# Patient Record
Sex: MALE | Race: WHITE
[De-identification: names, ages, dates, MRNs, and addresses within clinical notes are randomized per-mention and may not be internally consistent; named-entity substitution may affect disease eponyms.]

---

## 2018-03-30 ENCOUNTER — HOSPITAL ENCOUNTER (INPATIENT)
Dept: HOSPITAL 17 - PHED | Age: 83
LOS: 1 days | Discharge: HOME HEALTH | DRG: 69 | End: 2018-03-31
Attending: HOSPITALIST | Admitting: HOSPITALIST
Payer: MEDICARE

## 2018-03-30 VITALS
HEART RATE: 71 BPM | RESPIRATION RATE: 20 BRPM | OXYGEN SATURATION: 95 % | TEMPERATURE: 97.1 F | DIASTOLIC BLOOD PRESSURE: 91 MMHG | SYSTOLIC BLOOD PRESSURE: 161 MMHG

## 2018-03-30 VITALS
DIASTOLIC BLOOD PRESSURE: 84 MMHG | SYSTOLIC BLOOD PRESSURE: 153 MMHG | OXYGEN SATURATION: 97 % | RESPIRATION RATE: 18 BRPM | HEART RATE: 70 BPM

## 2018-03-30 VITALS
SYSTOLIC BLOOD PRESSURE: 192 MMHG | TEMPERATURE: 97.3 F | HEART RATE: 82 BPM | DIASTOLIC BLOOD PRESSURE: 98 MMHG | OXYGEN SATURATION: 96 % | RESPIRATION RATE: 18 BRPM

## 2018-03-30 VITALS — WEIGHT: 154.54 LBS | BODY MASS INDEX: 24.84 KG/M2 | HEIGHT: 66 IN

## 2018-03-30 VITALS — HEART RATE: 69 BPM

## 2018-03-30 DIAGNOSIS — G45.9: Primary | ICD-10-CM

## 2018-03-30 DIAGNOSIS — G62.9: ICD-10-CM

## 2018-03-30 DIAGNOSIS — Z72.0: ICD-10-CM

## 2018-03-30 DIAGNOSIS — E78.5: ICD-10-CM

## 2018-03-30 DIAGNOSIS — Z88.6: ICD-10-CM

## 2018-03-30 DIAGNOSIS — Z88.5: ICD-10-CM

## 2018-03-30 DIAGNOSIS — Z85.038: ICD-10-CM

## 2018-03-30 DIAGNOSIS — I44.4: ICD-10-CM

## 2018-03-30 LAB
ALBUMIN SERPL-MCNC: 3.3 GM/DL (ref 3.4–5)
ALP SERPL-CCNC: 79 U/L (ref 45–117)
ALT SERPL-CCNC: 24 U/L (ref 12–78)
AST SERPL-CCNC: 29 U/L (ref 15–37)
BACTERIA #/AREA URNS HPF: (no result) /HPF
BASOPHILS # BLD AUTO: 0.1 TH/MM3 (ref 0–0.2)
BASOPHILS NFR BLD: 0.6 % (ref 0–2)
BILIRUB SERPL-MCNC: 0.3 MG/DL (ref 0.2–1)
BUN SERPL-MCNC: 20 MG/DL (ref 7–18)
CALCIUM SERPL-MCNC: 8.4 MG/DL (ref 8.5–10.1)
CHLORIDE SERPL-SCNC: 98 MEQ/L (ref 98–107)
COLOR UR: YELLOW
CREAT SERPL-MCNC: 0.95 MG/DL (ref 0.6–1.3)
EOSINOPHIL # BLD: 0.3 TH/MM3 (ref 0–0.4)
EOSINOPHIL NFR BLD: 2.8 % (ref 0–4)
ERYTHROCYTE [DISTWIDTH] IN BLOOD BY AUTOMATED COUNT: 13 % (ref 11.6–17.2)
GFR SERPLBLD BASED ON 1.73 SQ M-ARVRAT: 76 ML/MIN (ref 89–?)
GLUCOSE SERPL-MCNC: 98 MG/DL (ref 74–106)
GLUCOSE UR STRIP-MCNC: (no result) MG/DL
HCO3 BLD-SCNC: 28.3 MEQ/L (ref 21–32)
HCT VFR BLD CALC: 47 % (ref 39–51)
HGB BLD-MCNC: 15.4 GM/DL (ref 13–17)
HGB UR QL STRIP: (no result)
INR PPP: 1 RATIO
KETONES UR STRIP-MCNC: 15 MG/DL
LEUKOCYTE ESTERASE UR QL STRIP: (no result) /HPF (ref 0–5)
LYMPHOCYTES # BLD AUTO: 2.3 TH/MM3 (ref 1–4.8)
LYMPHOCYTES NFR BLD AUTO: 20.4 % (ref 9–44)
MCH RBC QN AUTO: 29.2 PG (ref 27–34)
MCHC RBC AUTO-ENTMCNC: 32.8 % (ref 32–36)
MCV RBC AUTO: 89 FL (ref 80–100)
MONOCYTE #: 1.3 TH/MM3 (ref 0–0.9)
MONOCYTES NFR BLD: 11.2 % (ref 0–8)
NEUTROPHILS # BLD AUTO: 7.4 TH/MM3 (ref 1.8–7.7)
NEUTROPHILS NFR BLD AUTO: 65 % (ref 16–70)
NITRITE UR QL STRIP: (no result)
PLATELET # BLD: 267 TH/MM3 (ref 150–450)
PMV BLD AUTO: 8.9 FL (ref 7–11)
PROT SERPL-MCNC: 7.2 GM/DL (ref 6.4–8.2)
PROTHROMBIN TIME: 10.4 SEC (ref 9.8–11.6)
RBC # BLD AUTO: 5.28 MIL/MM3 (ref 4.5–5.9)
RBC #/AREA URNS HPF: (no result) /HPF (ref 0–3)
SODIUM SERPL-SCNC: 131 MEQ/L (ref 136–145)
SP GR UR STRIP: (no result) (ref 1–1.03)
SQUAMOUS #/AREA URNS HPF: (no result) /HPF (ref 0–5)
TROPONIN I SERPL-MCNC: (no result) NG/ML (ref 0.02–0.05)
URINE LEUKOCYTE ESTERASE: (no result)
WBC # BLD AUTO: 11.4 TH/MM3 (ref 4–11)

## 2018-03-30 PROCEDURE — 85025 COMPLETE CBC W/AUTO DIFF WBC: CPT

## 2018-03-30 PROCEDURE — 70551 MRI BRAIN STEM W/O DYE: CPT

## 2018-03-30 PROCEDURE — 93005 ELECTROCARDIOGRAM TRACING: CPT

## 2018-03-30 PROCEDURE — 83036 HEMOGLOBIN GLYCOSYLATED A1C: CPT

## 2018-03-30 PROCEDURE — 93880 EXTRACRANIAL BILAT STUDY: CPT

## 2018-03-30 PROCEDURE — 82550 ASSAY OF CK (CPK): CPT

## 2018-03-30 PROCEDURE — 93225 XTRNL ECG REC<48 HRS REC: CPT

## 2018-03-30 PROCEDURE — 70450 CT HEAD/BRAIN W/O DYE: CPT

## 2018-03-30 PROCEDURE — 93306 TTE W/DOPPLER COMPLETE: CPT

## 2018-03-30 PROCEDURE — 93226 XTRNL ECG REC<48 HR SCAN A/R: CPT

## 2018-03-30 PROCEDURE — 84484 ASSAY OF TROPONIN QUANT: CPT

## 2018-03-30 PROCEDURE — 81001 URINALYSIS AUTO W/SCOPE: CPT

## 2018-03-30 PROCEDURE — 82948 REAGENT STRIP/BLOOD GLUCOSE: CPT

## 2018-03-30 PROCEDURE — 99285 EMERGENCY DEPT VISIT HI MDM: CPT

## 2018-03-30 PROCEDURE — 71045 X-RAY EXAM CHEST 1 VIEW: CPT

## 2018-03-30 PROCEDURE — 80053 COMPREHEN METABOLIC PANEL: CPT

## 2018-03-30 PROCEDURE — 85730 THROMBOPLASTIN TIME PARTIAL: CPT

## 2018-03-30 PROCEDURE — 80061 LIPID PANEL: CPT

## 2018-03-30 PROCEDURE — 85610 PROTHROMBIN TIME: CPT

## 2018-03-30 PROCEDURE — 82607 VITAMIN B-12: CPT

## 2018-03-30 RX ADMIN — Medication SCH ML: at 21:49

## 2018-03-30 RX ADMIN — PHENYTOIN SODIUM SCH MLS/HR: 50 INJECTION INTRAMUSCULAR; INTRAVENOUS at 21:48

## 2018-03-30 RX ADMIN — STANDARDIZED SENNA CONCENTRATE AND DOCUSATE SODIUM SCH TAB: 8.6; 5 TABLET, FILM COATED ORAL at 21:48

## 2018-03-30 NOTE — RADRPT
EXAM DATE/TIME:  03/30/2018 19:57 

 

[facility] COMPARISON:     

No previous studies available for comparison.

 

                     

INDICATIONS :     

Chest pain. 

                     

 

MEDICAL HISTORY :     

Hypercholesterolemia.  Gastroesophageal reflux disease.     Neuropathy, Gastroenteritis, Shingles   

 

SURGICAL HISTORY :        

Colectomy, Left rotator cuff surgery

 

ENCOUNTER:     

Initial                                        

 

ACUITY:     

1 day      

 

PAIN SCORE:     

1/10

 

LOCATION:     

Bilateral chest 

 

FINDINGS:     

A single view of the chest demonstrates the lungs to be symmetrically aerated without evidence of mas
s, infiltrate or effusion.  The cardiomediastinal contours are unremarkable.  Osseous structures are 
intact.

 

CONCLUSION:     No acute disease.  

 

 

 

 Dank Cunningham MD on March 30, 2018 at 20:27           

Board Certified Radiologist.

 This report was verified electronically.

## 2018-03-30 NOTE — PD
HPI


Chief Complaint:  Speech difficulty


Time Seen by Provider:  19:22


Travel History


International Travel<30 days:  No


Contact w/Intl Traveler<30days:  No





History of Present Illness


HPI


Is an 82-year-old man presents to the emergency department with speech 

difficulty.  Wife reports he is a history of peripheral neuropathy and has some 

gait difficulties.  They went out to dinner.  He was doing fine at 530 or so.  

He got at home about 6.  He was walking in the house so she parked the car.  

When she saw him next he was having speech difficulties.  She reports that he 

was unable to appropriately form words, or express thoughts.  He described 

awareness that he could not get his thoughts out appropriately.  She did not 

notice any numbness tingling or weakness or facial asymmetry.  He was still 

walking.  He denies any history of previous similar symptoms.  No history of 

heart disease, arrhythmia, or diabetes.





History


Past Medical History


*** Narrative Medical


Peripheral neuropathy


Remote history of colon CA in the past


Hyperlipidemia





Social History


Alcohol Use:  No


Tobacco Use:  Yes (1PPD)





Allergies-Medications


(Allergen,Severity, Reaction):  


Coded Allergies:  


     acetaminophen (Unverified  Allergy, Severe, 3/30/18)


     oxycodone (Unverified  Allergy, Severe, 3/30/18)


 PERCOCET


     morphine (Unverified  Adverse Reaction, Mild, ITCHY, 3/30/18)


Reported Meds & Prescriptions





Reported Meds & Active Scripts


Active


Robaxin (Methocarbamol) 500 Mg Tab 500 Mg PO TID PRN


Reported


[Vitamin B12 Complex]   1 Tab PO DAILY


Vitamin C (Ascorbic Acid) 250 Mg Chew 500 Mg PO DAILY


Omeprazole 20 Mg Tab 20 Mg PO DAILY


Calcium 600 (Calcium Carbonate) 1,500 Mg Tab 1,500 Mg PO DAILY


Seamus Aspirin (Aspirin) 325 Mg Tab 325 Mg PO DAILY








Review of Systems


Except as stated in HPI:  all other systems reviewed are Neg





Physical Exam


Narrative


GENERAL: Well-appearing 82-year-old man, no acute distress.


SKIN: Focused skin assessment warm/dry.


HEAD: Atraumatic. Normocephalic. 


EYES: Pupils equal and round. No scleral icterus. No injection or drainage. 


ENT: No nasal bleeding or discharge.  Mucous membranes pink and moist.


NECK: Trachea midline. No JVD.  No carotid bruits.


CARDIOVASCULAR: Regular rate and rhythm.  No murmur appreciated.


RESPIRATORY: No accessory muscle use. Clear to auscultation. Breath sounds 

equal bilaterally. 


GASTROINTESTINAL: Abdomen soft, non-tender, nondistended. Hepatic and splenic 

margins not palpable. 


MUSCULOSKELETAL: No obvious deformities. No clubbing.  No cyanosis.  No edema. 


NEUROLOGICAL: Awake and alert.  Cranial nerves II through XII are intact.  No 

facial asymmetry.  Strength full and equal upper and lower extremities.  No 

pronator drift.  No lower extremity drift.  Normal finger to nose.  Normal heel 

to shin.  Sensation intact to light touch bilaterally.  Normal speech without 

dysarthria or aphasia.  NIH 0.


PSYCHIATRIC: Appropriate mood and affect; insight and judgment normal.





Data


Data


Last Documented VS





Vital Signs








  Date Time  Temp Pulse Resp B/P (MAP) Pulse Ox O2 Delivery O2 Flow Rate FiO2


 


3/30/18 20:30  70 18 153/84 (107) 97 Room Air  


 


3/30/18 19:22 97.3       








Orders





 Orders


Electrocardiogram (3/30/18 19:31)


Prothrombin Time / Inr (Pt) (3/30/18 19:31)


Act Partial Throm Time (Ptt) (3/30/18 19:31)


Complete Blood Count With Diff (3/30/18 19:31)


Comprehensive Metabolic Panel (3/30/18 19:31)


Creatine Kinase (Cpk) (3/30/18 19:31)


Troponin I (3/30/18 19:31)


Urinalysis - C+S If Indicated (3/30/18 19:31)


Ct Brain W/O Iv Contrast(Rout) (3/30/18 19:31)


Chest, Single Ap (3/30/18 19:31)


Ecg Monitoring (3/30/18 19:31)


Iv Access Insert/Monitor (3/30/18 19:31)


Oximetry (3/30/18 19:31)


Blood Glucose (3/30/18 19:31)


Sodium Chloride 0.9% Flush (Ns Flush) (3/30/18 19:45)


Admit Order (Ed Use Only) (3/30/18 )





Labs





Laboratory Tests








Test


  3/30/18


19:30


 


White Blood Count 11.4 TH/MM3 


 


Red Blood Count 5.28 MIL/MM3 


 


Hemoglobin 15.4 GM/DL 


 


Hematocrit 47.0 % 


 


Mean Corpuscular Volume 89.0 FL 


 


Mean Corpuscular Hemoglobin 29.2 PG 


 


Mean Corpuscular Hemoglobin


Concent 32.8 % 


 


 


Red Cell Distribution Width 13.0 % 


 


Platelet Count 267 TH/MM3 


 


Mean Platelet Volume 8.9 FL 


 


Neutrophils (%) (Auto) 65.0 % 


 


Lymphocytes (%) (Auto) 20.4 % 


 


Monocytes (%) (Auto) 11.2 % 


 


Eosinophils (%) (Auto) 2.8 % 


 


Basophils (%) (Auto) 0.6 % 


 


Neutrophils # (Auto) 7.4 TH/MM3 


 


Lymphocytes # (Auto) 2.3 TH/MM3 


 


Monocytes # (Auto) 1.3 TH/MM3 


 


Eosinophils # (Auto) 0.3 TH/MM3 


 


Basophils # (Auto) 0.1 TH/MM3 


 


CBC Comment DIFF FINAL 


 


Differential Comment  


 


Prothrombin Time 10.4 SEC 


 


Prothromb Time International


Ratio 1.0 RATIO 


 


 


Activated Partial


Thromboplast Time 27.5 SEC 


 


 


Blood Urea Nitrogen 20 MG/DL 


 


Creatinine 0.95 MG/DL 


 


Random Glucose 98 MG/DL 


 


Total Protein 7.2 GM/DL 


 


Albumin 3.3 GM/DL 


 


Calcium Level 8.4 MG/DL 


 


Alkaline Phosphatase 79 U/L 


 


Aspartate Amino Transf


(AST/SGOT) 29 U/L 


 


 


Alanine Aminotransferase


(ALT/SGPT) 24 U/L 


 


 


Total Bilirubin 0.3 MG/DL 


 


Sodium Level 131 MEQ/L 


 


Potassium Level 4.2 MEQ/L 


 


Chloride Level 98 MEQ/L 


 


Carbon Dioxide Level 28.3 MEQ/L 


 


Anion Gap 5 MEQ/L 


 


Estimat Glomerular Filtration


Rate 76 ML/MIN 


 


 


Total Creatine Kinase 72 U/L 


 


Troponin I


  LESS THAN 0.02


NG/ML











MDM


Medical Decision Making


Medical Screen Exam Complete:  Yes


Emergency Medical Condition:  Yes


Interpretation(s)


My review of EKG: Normal sinus rhythm at a rate of 76, leftward axis deviation, 

left anterior fascicular block, no definite evidence of acute ischemia.





LABS:


CBC is remarkable for mild leukocytosis. 


CMP is unremarkable.


Troponin negative.


Coags unremarkable.





Head CT: No acute abnormality.  Atrophy.


Chest x-ray: No acute disease.


Differential Diagnosis


TIA, CVA, speech difficulty, seizure, electrolyte normality, infection, other


Narrative Course


Medical decision making





INITIAL: pleasant 82-year-old man presents to the emergency department 

complaining of speech difficulties.  Looks well.  He states he still feels 

somewhat funny but I have an NIH of 0 now.  Concern for TIA.  Will check labs





ABCD 2 score is four-point, moderate risk.  We will plan on admission for 

further evaluation.





Diagnosis





 Primary Impression:  


 TIA (transient ischemic attack)





Admitting Information


Admitting Physician Requests:  Admit











Sid De Paz MD Mar 30, 2018 19:47

## 2018-03-31 VITALS
RESPIRATION RATE: 20 BRPM | TEMPERATURE: 96.6 F | OXYGEN SATURATION: 93 % | HEART RATE: 60 BPM | SYSTOLIC BLOOD PRESSURE: 169 MMHG | DIASTOLIC BLOOD PRESSURE: 79 MMHG

## 2018-03-31 VITALS — HEART RATE: 80 BPM

## 2018-03-31 VITALS
TEMPERATURE: 97.2 F | DIASTOLIC BLOOD PRESSURE: 85 MMHG | HEART RATE: 81 BPM | OXYGEN SATURATION: 95 % | SYSTOLIC BLOOD PRESSURE: 140 MMHG | RESPIRATION RATE: 20 BRPM

## 2018-03-31 VITALS
DIASTOLIC BLOOD PRESSURE: 85 MMHG | OXYGEN SATURATION: 93 % | SYSTOLIC BLOOD PRESSURE: 187 MMHG | HEART RATE: 68 BPM | RESPIRATION RATE: 20 BRPM | TEMPERATURE: 96.8 F

## 2018-03-31 VITALS
TEMPERATURE: 96.9 F | HEART RATE: 65 BPM | OXYGEN SATURATION: 92 % | RESPIRATION RATE: 20 BRPM | SYSTOLIC BLOOD PRESSURE: 170 MMHG | DIASTOLIC BLOOD PRESSURE: 87 MMHG

## 2018-03-31 LAB
ALBUMIN SERPL-MCNC: 3 GM/DL (ref 3.4–5)
ALP SERPL-CCNC: 72 U/L (ref 45–117)
ALT SERPL-CCNC: 19 U/L (ref 12–78)
AST SERPL-CCNC: 22 U/L (ref 15–37)
BASOPHILS # BLD AUTO: 0.1 TH/MM3 (ref 0–0.2)
BASOPHILS NFR BLD: 0.6 % (ref 0–2)
BILIRUB SERPL-MCNC: 0.4 MG/DL (ref 0.2–1)
BUN SERPL-MCNC: 16 MG/DL (ref 7–18)
CALCIUM SERPL-MCNC: 8.2 MG/DL (ref 8.5–10.1)
CHLORIDE SERPL-SCNC: 99 MEQ/L (ref 98–107)
CHOLEST SERPL-MCNC: 153 MG/DL (ref 120–200)
CHOLESTEROL/ HDL RATIO: 3.91 RATIO
CREAT SERPL-MCNC: 0.65 MG/DL (ref 0.6–1.3)
EOSINOPHIL # BLD: 0.3 TH/MM3 (ref 0–0.4)
EOSINOPHIL NFR BLD: 3.7 % (ref 0–4)
ERYTHROCYTE [DISTWIDTH] IN BLOOD BY AUTOMATED COUNT: 12.9 % (ref 11.6–17.2)
GFR SERPLBLD BASED ON 1.73 SQ M-ARVRAT: 118 ML/MIN (ref 89–?)
GLUCOSE SERPL-MCNC: 101 MG/DL (ref 74–106)
HBA1C MFR BLD: 6.2 % (ref 4.3–6)
HCO3 BLD-SCNC: 26.8 MEQ/L (ref 21–32)
HCT VFR BLD CALC: 43.5 % (ref 39–51)
HDLC SERPL-MCNC: 39.1 MG/DL (ref 40–60)
HGB BLD-MCNC: 14.6 GM/DL (ref 13–17)
LDLC SERPL-MCNC: 104 MG/DL (ref 0–99)
LYMPHOCYTES # BLD AUTO: 2.2 TH/MM3 (ref 1–4.8)
LYMPHOCYTES NFR BLD AUTO: 24.7 % (ref 9–44)
MCH RBC QN AUTO: 29.7 PG (ref 27–34)
MCHC RBC AUTO-ENTMCNC: 33.5 % (ref 32–36)
MCV RBC AUTO: 88.6 FL (ref 80–100)
MONOCYTE #: 1.3 TH/MM3 (ref 0–0.9)
MONOCYTES NFR BLD: 14.4 % (ref 0–8)
NEUTROPHILS # BLD AUTO: 5.2 TH/MM3 (ref 1.8–7.7)
NEUTROPHILS NFR BLD AUTO: 56.6 % (ref 16–70)
PLATELET # BLD: 237 TH/MM3 (ref 150–450)
PMV BLD AUTO: 8.5 FL (ref 7–11)
PROT SERPL-MCNC: 6.5 GM/DL (ref 6.4–8.2)
RBC # BLD AUTO: 4.91 MIL/MM3 (ref 4.5–5.9)
SODIUM SERPL-SCNC: 132 MEQ/L (ref 136–145)
TRIGL SERPL-MCNC: 52 MG/DL (ref 42–150)
WBC # BLD AUTO: 9.1 TH/MM3 (ref 4–11)

## 2018-03-31 RX ADMIN — PHENYTOIN SODIUM SCH MLS/HR: 50 INJECTION INTRAMUSCULAR; INTRAVENOUS at 08:35

## 2018-03-31 RX ADMIN — STANDARDIZED SENNA CONCENTRATE AND DOCUSATE SODIUM SCH TAB: 8.6; 5 TABLET, FILM COATED ORAL at 08:36

## 2018-03-31 RX ADMIN — PHENYTOIN SODIUM SCH MLS/HR: 50 INJECTION INTRAMUSCULAR; INTRAVENOUS at 16:56

## 2018-03-31 RX ADMIN — INSULIN ASPART SCH: 100 INJECTION, SOLUTION INTRAVENOUS; SUBCUTANEOUS at 17:00

## 2018-03-31 RX ADMIN — Medication SCH ML: at 08:36

## 2018-03-31 RX ADMIN — INSULIN ASPART SCH: 100 INJECTION, SOLUTION INTRAVENOUS; SUBCUTANEOUS at 11:48

## 2018-03-31 NOTE — HHI.FF
Face to Face Verification


Diagnosis:  


(1) Muscle weakness of lower extremity


(2) TIA (transient ischemic attack)


Physical Therapy


Order:  Evaluate and Treat





Home Health Nursing








Order: Signs/symptoms of disease process





 Nursing assessment with vital signs

















I have seen patient Josef Khan on 3/31/18. My clinical findings 

support the need for the requested home health care services because:








 Limited ability to care for self














I certify that my clinical findings support that this patient is homebound 

because:








 Unsafe to leave home unassisted

















Luis Reyes MD Mar 31, 2018 15:50

## 2018-03-31 NOTE — HHI.HP
__________________________________________________





[facility]


Service


[facility]


Primary Care Physician


Jonn Nascimento MD


Admission Diagnosis





TIA


Diagnoses:  


Chief Complaint:  


Garbled speech


Travel History


International Travel<30 Days:  No


Contact w/Intl Traveler <30 Da:  No


Traveled to Known Affected Are:  No


History of Present Illness


82-year-old white male being admitted for strokelike symptoms.





Patient was in his usual state of health until sometime last night around 

dinner when his wife noted that he began having very profound difficulty 

expressing his words, had some garbled speech.  Wife noted that patient pulled 

out his wallet to switch his clothing would look at his wallet for prolonged 

period of time confused about what he was doing.  Patient himself says that he 

knew what he wanted to tell his wife but just could not get the words out.  He 

denies having any significant nausea vomiting vision changes or headaches 

around the incident.  Denies having any new focal neurological deficits 

including weakness numbness or tingling in his extremities.  Wife gave him 2 

doses of 325 mg aspirin and ultimately brought him to the ER.  Patient does 

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 

was a former drummer, also says he has a diagnosis of peripheral neuropathy by 

neurologist but does not have in origin pinpointed.





Patient wife say that he has had issues with garbled speech in the past 

intermittently but not as profound as last night.  Says that he was taken off 

of Lipitor by his PCP at some point in the past because his cholesterol did not 

show much improvement with it.  Says that he takes at 325 mg aspirin daily.





Review of Systems


Except as stated in HPI:  all other systems reviewed are Neg





Past Family Social History


Past Medical History


No prior history of strokes or myocardial infarctions


Diagnosis of peripheral neuropathy, hyperlipidemia, remote history of colon 

cancer


Allergies:  


Coded Allergies:  


     acetaminophen (Unverified  Allergy, Severe, 3/30/18)


     oxycodone (Unverified  Allergy, Severe, 3/30/18)


 PERCOCET


     morphine (Unverified  Adverse Reaction, Mild, ITCHY, 3/30/18)


Family History


Mother with a history of heart problems


Social History


Lifelong history of smoking until about 7 weeks ago, denies drinking





Physical Exam


Vital Signs





Vital Signs








  Date Time  Temp Pulse Resp B/P (MAP) Pulse Ox O2 Delivery O2 Flow Rate FiO2


 


3/31/18 08:00 96.9 65 20 170/87 (114) 92   


 


3/31/18 04:00 96.6 60 20 169/79 (109) 93   


 


3/30/18 22:33 97.1 71 20 161/91 (114) 95   


 


3/30/18 22:30  69      


 


3/30/18 22:21        


 


3/30/18 20:30  70 18 153/84 (107) 97 Room Air  


 


3/30/18 19:25  82    Room Air  


 


3/30/18 19:22 97.3 82 18 192/98 (129) 96   








Physical Exam


VS: afebrile


GENERAL: Lying in bed, awake, alert, no acute distress


SKIN: Warm and dry.


EYES: Pupils equal and round. No scleral icterus. No injection or drainage. 


ENT: No nasal bleeding or discharge.  Mucous membranes pink and moist.


CARDIOVASCULAR: Regular rate and rhythm.  no murmurs


RESPIRATORY: No accessory muscle use. Clear to auscultation. Breath sounds 

equal bilaterally. 


GASTROINTESTINAL: Abdomen soft, non-tender, nondistended. 


Extremities: No clubbing, cyanosis, or edema. No obvious deformities. 


MUSCULOSKELETAL: grossly intact ROM with 5/5 strength in upper and lower 

extremities proximally; adequate muscle bulk and tone for age and habitus


NEUROLOGICAL: Awake and alert. No obvious cranial nerve deficits.  No facial 

droop nor slurred speech noted.  Alert and oriented 3, uvula and midline, 

tongue protrusion midline.  Bilateral patellar reflexes +1, intact sensation to 

gross finger touch symmetrically over proximal upper and lower extremities as 

well as his face


PSYCHIATRIC: Appropriate mood and affect; insight and judgment normal.


Laboratory





Laboratory Tests








Test


  3/30/18


19:30 3/30/18


23:15 3/31/18


07:05


 


White Blood Count 11.4   9.1 


 


Red Blood Count 5.28   4.91 


 


Hemoglobin 15.4   14.6 


 


Hematocrit 47.0   43.5 


 


Mean Corpuscular Volume 89.0   88.6 


 


Mean Corpuscular Hemoglobin 29.2   29.7 


 


Mean Corpuscular Hemoglobin


Concent 32.8 


  


  33.5 


 


 


Red Cell Distribution Width 13.0   12.9 


 


Platelet Count 267   237 


 


Mean Platelet Volume 8.9   8.5 


 


Neutrophils (%) (Auto) 65.0   56.6 


 


Lymphocytes (%) (Auto) 20.4   24.7 


 


Monocytes (%) (Auto) 11.2   14.4 


 


Eosinophils (%) (Auto) 2.8   3.7 


 


Basophils (%) (Auto) 0.6   0.6 


 


Neutrophils # (Auto) 7.4   5.2 


 


Lymphocytes # (Auto) 2.3   2.2 


 


Monocytes # (Auto) 1.3   1.3 


 


Eosinophils # (Auto) 0.3   0.3 


 


Basophils # (Auto) 0.1   0.1 


 


CBC Comment DIFF FINAL   DIFF FINAL 


 


Differential Comment     


 


Prothrombin Time 10.4   


 


Prothromb Time International


Ratio 1.0 


  


  


 


 


Activated Partial


Thromboplast Time 27.5 


  


  


 


 


Blood Urea Nitrogen 20   16 


 


Creatinine 0.95   0.65 


 


Random Glucose 98   101 


 


Total Protein 7.2   6.5 


 


Albumin 3.3   3.0 


 


Calcium Level 8.4   8.2 


 


Alkaline Phosphatase 79   72 


 


Aspartate Amino Transf


(AST/SGOT) 29 


  


  22 


 


 


Alanine Aminotransferase


(ALT/SGPT) 24 


  


  19 


 


 


Total Bilirubin 0.3   0.4 


 


Sodium Level 131   132 


 


Potassium Level 4.2   3.9 


 


Chloride Level 98   99 


 


Carbon Dioxide Level 28.3   26.8 


 


Anion Gap 5   6 


 


Estimat Glomerular Filtration


Rate 76 


  


  118 


 


 


Total Creatine Kinase 72   


 


Troponin I LESS THAN 0.02   


 


Urine Color  YELLOW  


 


Urine Turbidity  CLEAR  


 


Urine pH  5.5  


 


Urine Specific Gravity


  


  GREATER/EQUAL


1.030 


 


 


Urine Protein  NEG  


 


Urine Glucose (UA)  NEG  


 


Urine Ketones  15  


 


Urine Occult Blood  TRACE  


 


Urine Nitrite  NEG  


 


Urine Bilirubin  NEG  


 


Urine Urobilinogen  0.2  


 


Urine Leukocyte Esterase  NEG  


 


Urine RBC  3-5  


 


Urine WBC  0-2  


 


Urine Squamous Epithelial


Cells 


  0-5 


  


 


 


Urine Bacteria  NONE  


 


Microscopic Urinalysis Comment


  


  CULT NOT


INDICATED 


 








Result Diagram:  


3/31/18 0705                                                                   

             3/31/18 0705








Caprini VTE Risk Assessment


Caprini VTE Risk Assessment:  Mod/High Risk (score >= 2)


Caprini Risk Assessment Model











 Point Value = 1          Point Value = 2  Point Value = 3  Point Value = 5


 


Age 41-60


Minor surgery


BMI > 25 kg/m2


Swollen legs


Varicose veins


Pregnancy or postpartum


History of unexplained or recurrent


   spontaneous 


Oral contraceptives or hormone


   replacement


Sepsis (< 1 month)


Serious lung disease, including


   pneumonia (< 1 month)


Abnormal pulmonary function


Acute myocardial infarction


Congestive heart failure (< 1 month)


History of inflammatory bowel disease


Medical patient at bed rest Age 61-74


Arthroscopic surgery


Major open surgery (> 45 min)


Laparoscopic surgery (> 45 min)


Malignancy


Confined to bed (> 72 hours)


Immobilizing plaster cast


Central venous access Age >= 75


History of VTE


Family history of VTE


Factor V Leiden


Prothrombin 84220C


Lupus anticoagulant


Anticardiolipin antibodies


Elevated serum homocysteine


Heparin-induced thrombocytopenia


Other congenital or acquired


   thrombophilia Stroke (< 1 month)


Elective arthroplasty


Hip, pelvis, or leg fracture


Acute spinal cord injury (< 1 month)








Prophylaxis Regimen











   Total Risk


Factor Score Risk Level Prophylaxis Regimen


 


0-1      Low Early ambulation


 


2 Moderate Order ONE of the following:


*Sequential Compression Device (SCD)


*Heparin 5000 units SQ BID


 


3-4 Higher Order ONE of the following medications:


*Heparin 5000 units SQ TID


*Enoxaparin/Lovenox 40 mg SQ daily (WT < 150 kg, CrCl > 30 mL/min)


*Enoxaparin/Lovenox 30 mg SQ daily (WT < 150 kg, CrCl > 10-29 mL/min)


*Enoxaparin/Lovenox 30 mg SQ BID (WT < 150 kg, CrCl > 30 mL/min)


AND/OR


*Sequential Compression Device (SCD)


 


5 or more Highest Order ONE of the following medications:


*Heparin 5000 units SQ TID (Preferred with Epidurals)


*Enoxaparin/Lovenox 40 mg SQ daily (WT < 150 kg, CrCl > 30 mL/min)


*Enoxaparin/Lovenox 30 mg SQ daily (WT < 150 kg, CrCl > 10-29 mL/min)


*Enoxaparin/Lovenox 30 mg SQ BID (WT < 150 kg, CrCl > 30 mL/min)


AND


*Sequential Compression Device (SCD)











Assessment and Plan


Assessment and Plan


82-year-old white male being admitted for strokelike symptoms





Strokelike symptoms


-Suspected TIA, CT head is negative


-MRI, echo, carotid ultrasounds


-PT OT and ST


-Fall precaution


-Aspirin, switch over to Lipitor, already received pravastatin for the day


- Permissive hypertension for 24 hours since admission which was approximately 

8 PM since last night


- lipid panel pending


- holter monitor upon discharge, independently reviewed the EKG which shows no 

A. fib





Peripheral neuropathy


-fall precautions, ambulation w/ assistance, b12 level ordered





lovenox





Addendum: Clear for discharge from neurology standpoint pending unremarkable 

studies.  MRI, echo, carotid ultrasound were all unremarkable.  Patient and 

wife were counseled to have him follow-up with neurology and cardiology as an 

outpatient.  Being discharged with a Holter monitor.  Patient has met maximal 

benefit from hospitalization is clinically stable for discharge. Scripts of 

lipitor and plavix added. to remain on aspirin for 3 more days per neurology.





Physician Certification


2 Midnight Certification Type:  Admission for Inpatient Services


Order for Inpatient Services


The services are ordered in accordance with Medicare regulations or non-

Medicare payer requirements, as applicable.  In the case of services not 

specified as inpatient-only, they are appropriately provided as inpatient 

services in accordance with the 2-midnight benchmark.


Estimated LOS (days):  2


2 days is the estimated time the patient will need to remain in the hospital, 

assuming treatment plan goals are met and no additional complications.


Post-Hospital Plan:  Home











Luis Reyes MD Mar 31, 2018 09:53

## 2018-03-31 NOTE — HHI.DCPOC
Discharge Care Plan


Diagnosis:  


(1) TIA (transient ischemic attack)


Additional Problems


Aspirin only for 3 more days per neurology. Continue plavix nonetheless


See cardiology for evaluation of possible afib.


Goals to Promote Your Health


* To prevent worsening of your condition and complications


* To maintain your health at the optimal level


Directions to Meet Your Goals


*** Take your medications as prescribed


*** Follow your dietary instruction


*** Follow activity as directed








*** Keep your appointments as scheduled


*** Take your immunizations and boosters as scheduled


*** If your symptoms worsen call your PCP, if no PCP go to Urgent Care Center 

or Emergency Room***


*** Smoking is Dangerous to Your Health. Avoid second hand smoke***


***Call the 24-hour hour crisis hotline for domestic abuse at 1-349.933.9152***











Luis Reyes MD Mar 31, 2018 15:19

## 2018-03-31 NOTE — RADRPT
EXAM DATE/TIME:  03/31/2018 16:04 

 

[facility] COMPARISON:     

No previous studies available for comparison.

        

 

 

INDICATIONS :     

TIA

                     

 

MEDICAL HISTORY :     

Hypercholesterolemia. Gastroesophageal reflux disease. Arthritis. Neuropathy. Colon cancer. Inguinal 
hernia. Shingles. 

 

SURGICAL HISTORY :          

Colon resection. Hernia repairs. Left rotator cuff. 

 

ENCOUNTER:     

Initial

 

ACUITY:     

1 day

 

PAIN SCORE:     

0/10

 

LOCATION:     

Bilateral neck 

                     

PEAK SYSTOLIC VELOCITIES (cm/sec):

 

ICA/CCA RATIO:                    

Right: 0.9     Left: 0.7

 

ICA:                          

Right: 59     Left: 58

 

CCA:                          

Right: 63     Left: 80

 

ECA:                           

Right: 58     Left: 63

 

VERTEBRAL:           

Right: 28 antegrade     Left: 47 antegrade

             

Elevated flow velocities and ICA/CCA ratios have been found to correlate with increased degrees of

vessel stenosis, calculated as percentage of diameter relative to a normal segment of distal ICA/CCA

 

FINDINGS:     

 

RIGHT CAROTID:     

No significant stenosis is visualized.  The waveforms are within normal limits.

 

LEFT CAROTID:     

No significant stenosis is visualized.  The waveforms are within normal limits.

 

VERTEBRAL ARTERIES:  

Antegrade flow is seen in both vertebral arteries.

 

MISCELLANEOUS:     

None.

 

CONCLUSION:     

No evidence for hemodynamically significant stenosis.

 

 

 

 Edi Worrell MD on March 31, 2018 at 16:51           

Board Certified Radiologist.

 This report was verified electronically.

## 2018-03-31 NOTE — RADRPT
EXAM DATE/TIME:  03/31/2018 10:09 

 

[facility] COMPARISON:     

CT BRAIN W/O CONTRAST, March 30, 2018, 19:51.

       

 

 

INDICATIONS :     

Aphasia. TIA.

                     

 

MEDICAL HISTORY :     

Carcinoma, colon. Hypercholesterolemia.   Peripheral neuropathy.

 

SURGICAL HISTORY :     

Colon resection.     

 

ENCOUNTER:     

Initial

 

ACUITY:     

1 day

 

PAIN SCORE:     

0/10

 

LOCATION:       

cranial 

 

TECHNIQUE:     

Multiplanar, multisequence MRI of the brain was performed without contrast.

 

FINDINGS:     

 

CEREBRUM:     

The ventricles are normal for age.  No evidence of midline shift, mass lesion, hemorrhage or acute in
farction.  No extraaxial fluid collections are seen.  The pituitary gland and suprasellar cistern are
 normal in configuration.

 

WHITE MATTER:     

On the flair weighted images there is increased signal noted in the area ventricular white matter starr
racteristic of chronic small vessel ischemic change.

 

POSTERIOR FOSSA:     

The cerebellum and brainstem are intact.  The 4th ventricle is midline. The cerebellopontine angle is
 unremarkable.  The cerebellar tonsils are normal in position.

 

DIFFUSION IMAGING:     

No focal areas of restricted diffusion are seen.  No evidence of acute infarction.

 

EXTRACRANIAL:     

The visualized portions of the orbits and paranasal sinuses are unremarkable.

 

CONCLUSION:     

1. No acute hemorrhage, mass or stroke.

2. Atrophy and chronic small vessel ischemic change.

 

 

 

 Delfino Truong MD on March 31, 2018 at 10:27           

Board Certified Radiologist.

 This report was verified electronically.

## 2018-03-31 NOTE — EKG
Date Performed: 03/30/2018       Time Performed: 19:40:04

 

PTAGE:      82 years

 

EKG:      Sinus rhythm 

 

 PATTERN CONSISTENT WITH PULMONARY DISEASE LEFT ANTERIOR FASCICULAR BLOCK ABNORMAL ECG

 

PREVIOUS TRACING       : 08/20/2006 04.48Since the previous tracing, no significant change noted

 

DOCTOR:   Brent Ramirez  Interpretating Date/Time  03/31/2018 13:57:55

## 2018-03-31 NOTE — MB
cc:

Bo Woodruff MD, PhD

****

 

 

DATE:

03/31/2018

 

REASON FOR CONSULTATION:

TIA.

 

HISTORY OF PRESENT ILLNESS:

Mr. Khan is a very nice 82-year-old left-handed white male who was 

in his usual state of good health until yesterday evening when he 

suddenly had speech difficulty.  He states he had tried to talk to his

wife to explain something, but it came out "gibberish."  He states 

that the words did not make any sense.  He had no focal weakness.  No 

facial droop, etc.  The symptoms lasted 10 or 15 minutes, then 

resolved.  He has no prior history of stroke.

 

PAST MEDICAL HISTORY:

Remarkable for peripheral neuropathy, hyperlipidemia, history of colon

cancer.

 

MEDICATIONS:

At home, he takes aspirin 1 a day.  His wife gave him an extra aspirin

yesterday evening when this happened.  He also takes Robaxin, vitamin 

B12 and vitamin C, omeprazole, calcium.

 

NEUROLOGICAL EXAMINATION:

VITAL SIGNS:  Blood pressure is 170/87, pulse is 65, respiratory rate 

is 20, temperature 96.9 degrees.

HIGHER CORTICAL FUNCTIONS:  At this time, he is alert.  He is oriented

x 3.  His speech is fluent.  Comprehension normal.  CRANIAL NERVES:  

2-12 are normal.

MOTOR EXAM:  Shows 5/5 strength of all groups in both upper and lower 

extremities.  There is no drift.  Fine motor skills normal.  REFLEXES:

 Symmetric.

CEREBELLAR TESTING:  Normal with no dysmetria.

 

IMAGING STUDIES:

CT brain, no acute change present.  MRI brain pending.

 

LABORATORY DATA:

White count 9100; hemoglobin 14.6; hematocrit 43%; platelets 237,000. 

PT 10.4, INR 1, APTT 27.5.  Sodium is 132, potassium 3.9, chloride 99,

CO2 is 26.8.  The BUN is 16, creatinine 0.65.   GFR is 118, glucose 

101, AST 22, ALT 19.  Cholesterol 153, , HDL 39.

 

IMPRESSION:

Transient ischemic attack, which has resolved.

 

RECOMMENDATIONS:

Recommend starting Plavix 75 mg daily.  Continue aspirin but could 

stop aspirin in 3 days.  Also, check MRI of the brain.  We will check 

a carotid ultrasound and echocardiogram, monitor cardiac telemetry, 

rule out atrial fibrillation.  He is on a statin for the high LDL.  If

no etiology is found, consider outpatient cardiology evaluation for a 

loop recorder to be sure there is no intermittent atrial fibrillation.

 

Thank you for asking us to see this nice patient in consult.

 

 

__________________________________

Bo Woodruff MD, PhD

 

 

LUISANA/CRISTI

D: 03/31/2018, 10:23 AM

T: 03/31/2018, 11:46 AM

Visit #: [id number]

Job #: [id number]

## 2018-03-31 NOTE — ECHRPT
Indication:   CVA/TIA

 

 CONCLUSIONS

 Normal left ventricular size. 

 Mild concentric left ventricular hypertrophy. 

 The left ventricular systolic function is normal with an estimated ejection fraction in the range of
 55-60%. 

 Trivial pulmonary valve regurgitation. 

 

 

 

 BP:  170   / 87      HR: 80                       Rhythm:           Sinus

 

 MEASUREMENTS  (Male / Female) Normal Values       Technical Quality:Fair

 2D ECHO

 LV Diastolic Diameter PLAX        4.1 cm                4.2 - 5.9 / 3.9 - 5.3 cm

 LV Systolic Diameter PLAX         3.0 cm                

 IVS Diastolic Thickness           1.2 cm                0.6 - 1.0 / 0.6 - 0.9 cm

 LVPW Diastolic Thickness          1.2 cm                0.6 - 1.0 / 0.6 - 0.9 cm

 LV Relative Wall Thickness        0.6                   

 RV Internal Dim ED PLAX           2.1 cm                

 LVOT Diameter                     2.4 cm                

 Aortic Root Diameter              3.7 cm                

 LA Systolic Diameter LX           3.1 cm                3.0 - 4.0 / 2.7 - 3.8 cm

 

 M-MODE

 AV Cusp Separation MM             1.9 cm                

 

 DOPPLER

 AV Peak Velocity                  96.5 cm/s             

 AV Peak Gradient                  3.7 mmHg              

 AV Mean Gradient                  2.0 mmHg              

 AV Velocity Time Integral         17.0 cm               

 LVOT Peak Velocity                57.1 cm/s             

 LVOT Peak Gradient                1.3 mmHg              

 LVOT Velocity Time Integral       10.0 cm               

 AV Area Cont Eq vti               2.7 cm               

 AV Area Cont Eq pk                2.7 cm               

 Mitral E Point Velocity           53.3 cm/s             

 Mitral A Point Velocity           90.3 cm/s             

 Mitral E to A Ratio               0.6                   

 LV E' Lateral Velocity            6.9 cm/s              

 Mitral E to LV E' Lateral Ratio   7.7                   

 LV E' Septal Velocity             4.8 cm/s              

 Mitral E to LV E' Septal Ratio    11.2                  

 PV Peak Velocity                  49.6 cm/s             

 PV Peak Gradient                  1.0 mmHg              

 

 

 FINDINGS

 

 LEFT VENTRICLE

 Normal left ventricular size. 

 Mild concentric left ventricular hypertrophy. 

 The left ventricular systolic function is normal with an estimated ejection fraction in the range of
 55-60%. 

 

 RIGHT VENTRICLE

 Normal right ventricular size and systolic function. 

 

 LEFT ATRIUM

 The left atrial size is normal. 

 

 RIGHT ATRIUM

 The right atrial size is normal. 

 

 ATRIAL SEPTUM

 The interatrial septum not well visualized. 

 

 AORTA

 The aortic root and proximal ascending aorta are not well visualized. 

 

 MITRAL VALVE

 Structurally normal mitral valve. No mitral valve stenosis or regurgitation. 

 

 AORTIC VALVE

 Trileaflet aortic valve with mild sclerosis.

 

 TRICUSPID VALVE

 Structurally normal tricuspid valve. No tricuspid valve stenosis or regurgitation. 

 

 PULMONARY VALVE

 Trivial pulmonary valve regurgitation. 

 

 VESSELS

 The inferior vena cava was not well visualized. 

 

 PERICARDIUM

 No pericardial effusion. 

 

 

 

 

  Hollis Williamson MD

  (Electronically Signed)

  Final Date:31 March 2018 14:36

## 2018-04-03 NOTE — HM
Date Performed: 03/31/2018       Time Performed: 15:11:00

 

HOOKUP DATE:      03/31/18 03:11:00 PM Sat 

 

     

ANALYSIS START TIME:      3/31/2018 3:16:00 PM

     

ANALYSIS END TIME:      4/1/2018 3:19:20 PM

     

PATIENT AGE:      82

     

PATIENT HEIGHT     

     

PATIENT WEIGHT     

     

DRUG LIST     

     

PATIENT DIAGNOSIS:      TIA

     

TEST NARRATIVE:          The patient's average heart rate was 84 BPM.  Heart rates greater than 120 B
PM were noted 2% of the time.  No episodes of bradycardia were noted.     No pauses exceeding 2.0 sec
onds were noted.     743 ventricular ectopics, which represented 1% of the total beat count, were not
ed.  The highest ventricular ectopic frequency occurred from 10:00 AM to 11:00 AM Sun.  During this t
ferain 53 VE(s) occurred.  Ventricular ectopics were observed as 732 isolated beat(s), as 4 couplet(s) a
nd as 1 run(s).     314 supraventricular ectopics, which represented < 1% of the total beat count, we
re noted.  The highest supraventricular ectopic frequency occurred from 09:00 PM to 10:00 PM Sat.  Du
ring this time 40 SVE(s) occurred.     No episodes of ST depression (defined as -1.0 mm or more) were
 noted in channel 1.  No episodes of ST depression (defined as -1.0 mm or more) were noted in channel
 2.  No episodes of ST depression (defined as -1.0 mm or more) were noted in channel 3.

     

TEST INTERPRETATION:      Agree as dictated.                                                         
            Signed by : Tung Orozco

## 2018-05-14 ENCOUNTER — HOSPITAL ENCOUNTER (OUTPATIENT)
Dept: HOSPITAL 17 - HRAD | Age: 83
End: 2018-05-14
Attending: FAMILY MEDICINE
Payer: MEDICARE

## 2018-05-14 DIAGNOSIS — R91.8: Primary | ICD-10-CM

## 2018-05-14 NOTE — RADRPT
EXAM DATE/TIME:  05/14/2018 00:00 

 

[facility] COMPARISON:     

CT BRAIN W/O CONTRAST, March 30, 2018, 19:51.

 

INDICATIONS :     

CT guided lung biopsy 

      

 

FINDINGS:     

The patient's CT imaging was made available to evaluate for potential CT-guided biopsy of the right l
ishmael.

 

Review of the patient's CT imaging demonstrates several small peripheral nodules however there is a v
kuldeep large central mass which wraps around and partially compresses the right mainstem bronchus as wel
l as extensive hilar and mediastinal adenopathy. The location of this patient's adenopathy should be 
readily amenable to navigation of bronchoscopic biopsy.

 

CONCLUSION:     

There is a large mediastinal mass which should be readily amenable to navigation bronchoscopy for bio
psy. 

 

 

 

 Shahriar Middleton MD on May 14, 2018 at 14:18           

Board Certified Radiologist.

 This report was verified electronically.

## 2018-05-18 ENCOUNTER — HOSPITAL ENCOUNTER (OUTPATIENT)
Age: 83
End: 2018-05-18

## 2018-05-18 DIAGNOSIS — R91.1: ICD-10-CM

## 2018-05-18 DIAGNOSIS — Z87.891: ICD-10-CM

## 2018-05-18 DIAGNOSIS — J44.9: Primary | ICD-10-CM

## 2018-05-25 ENCOUNTER — HOSPITAL ENCOUNTER (OUTPATIENT)
Dept: HOSPITAL 17 - CPRE | Age: 83
End: 2018-05-25
Attending: INTERNAL MEDICINE
Payer: MEDICARE

## 2018-05-25 DIAGNOSIS — Z01.810: ICD-10-CM

## 2018-05-25 DIAGNOSIS — Z79.82: ICD-10-CM

## 2018-05-25 DIAGNOSIS — R94.31: ICD-10-CM

## 2018-05-25 DIAGNOSIS — Z01.812: Primary | ICD-10-CM

## 2018-05-25 DIAGNOSIS — Z79.01: ICD-10-CM

## 2018-05-25 LAB
BUN SERPL-MCNC: 16 MG/DL (ref 7–18)
CALCIUM SERPL-MCNC: 8.9 MG/DL (ref 8.5–10.1)
CHLORIDE SERPL-SCNC: 97 MEQ/L (ref 98–107)
CREAT SERPL-MCNC: 0.85 MG/DL (ref 0.6–1.3)
ERYTHROCYTE [DISTWIDTH] IN BLOOD BY AUTOMATED COUNT: 13.8 % (ref 11.6–17.2)
GFR SERPLBLD BASED ON 1.73 SQ M-ARVRAT: 87 ML/MIN (ref 89–?)
HCO3 BLD-SCNC: 28 MEQ/L (ref 21–32)
HCT VFR BLD CALC: 45.9 % (ref 39–51)
HGB BLD-MCNC: 15.2 GM/DL (ref 13–17)
INR PPP: 1.1 RATIO
MCH RBC QN AUTO: 30.4 PG (ref 27–34)
MCHC RBC AUTO-ENTMCNC: 33.1 % (ref 32–36)
MCV RBC AUTO: 91.8 FL (ref 80–100)
PLATELET # BLD: 258 TH/MM3 (ref 150–450)
PMV BLD AUTO: 9 FL (ref 7–11)
PROTHROMBIN TIME: 10.7 SEC (ref 9.8–11.6)
RBC # BLD AUTO: 5 MIL/MM3 (ref 4.5–5.9)
SODIUM SERPL-SCNC: 134 MEQ/L (ref 136–145)
WBC # BLD AUTO: 10.4 TH/MM3 (ref 4–11)

## 2018-05-25 PROCEDURE — 93005 ELECTROCARDIOGRAM TRACING: CPT

## 2018-05-25 PROCEDURE — 85730 THROMBOPLASTIN TIME PARTIAL: CPT

## 2018-05-25 PROCEDURE — 80048 BASIC METABOLIC PNL TOTAL CA: CPT

## 2018-05-25 PROCEDURE — 36415 COLL VENOUS BLD VENIPUNCTURE: CPT

## 2018-05-25 PROCEDURE — 85610 PROTHROMBIN TIME: CPT

## 2018-05-25 PROCEDURE — 85027 COMPLETE CBC AUTOMATED: CPT

## 2018-05-25 NOTE — EKG
Date Performed: 05/25/2018       Time Performed: 09:58:35

 

PTAGE:      82 years

 

EKG:      Sinus rhythm 

 

 POSSIBLE LEFT ATRIAL ENLARGEMENT PATTERN CONSISTENT WITH PULMONARY DISEASE LEFT ANTERIOR FASCICULAR 
BLOCK Since previous tracing, no significant change noted ABNORMAL ECG

 

PREVIOUS TRACING       : 03/30/2018 19.40

 

DOCTOR:   Jolynn Hemphill  Interpretating Date/Time  05/25/2018 18:26:22

## 2018-05-29 ENCOUNTER — HOSPITAL ENCOUNTER (OUTPATIENT)
Dept: HOSPITAL 17 - HROP | Age: 83
Discharge: HOME | End: 2018-05-29
Attending: INTERNAL MEDICINE
Payer: MEDICARE

## 2018-05-29 VITALS
TEMPERATURE: 98 F | DIASTOLIC BLOOD PRESSURE: 74 MMHG | RESPIRATION RATE: 18 BRPM | OXYGEN SATURATION: 88 % | HEART RATE: 89 BPM | SYSTOLIC BLOOD PRESSURE: 144 MMHG

## 2018-05-29 VITALS
OXYGEN SATURATION: 93 % | TEMPERATURE: 97.7 F | SYSTOLIC BLOOD PRESSURE: 166 MMHG | RESPIRATION RATE: 18 BRPM | DIASTOLIC BLOOD PRESSURE: 96 MMHG | HEART RATE: 88 BPM

## 2018-05-29 VITALS
HEART RATE: 86 BPM | OXYGEN SATURATION: 89 % | DIASTOLIC BLOOD PRESSURE: 60 MMHG | SYSTOLIC BLOOD PRESSURE: 121 MMHG | RESPIRATION RATE: 18 BRPM

## 2018-05-29 VITALS — WEIGHT: 138.89 LBS | HEIGHT: 65 IN | BODY MASS INDEX: 23.14 KG/M2

## 2018-05-29 VITALS
HEART RATE: 88 BPM | DIASTOLIC BLOOD PRESSURE: 62 MMHG | RESPIRATION RATE: 20 BRPM | SYSTOLIC BLOOD PRESSURE: 128 MMHG | OXYGEN SATURATION: 92 %

## 2018-05-29 DIAGNOSIS — Z85.038: ICD-10-CM

## 2018-05-29 DIAGNOSIS — R31.9: ICD-10-CM

## 2018-05-29 DIAGNOSIS — F17.200: ICD-10-CM

## 2018-05-29 DIAGNOSIS — Z86.73: ICD-10-CM

## 2018-05-29 DIAGNOSIS — K21.9: ICD-10-CM

## 2018-05-29 DIAGNOSIS — C34.11: Primary | ICD-10-CM

## 2018-05-29 DIAGNOSIS — J18.9: ICD-10-CM

## 2018-05-29 PROCEDURE — 87015 SPECIMEN INFECT AGNT CONCNTJ: CPT

## 2018-05-29 PROCEDURE — 94640 AIRWAY INHALATION TREATMENT: CPT

## 2018-05-29 PROCEDURE — 94664 DEMO&/EVAL PT USE INHALER: CPT

## 2018-05-29 PROCEDURE — 31625 BRONCHOSCOPY W/BIOPSY(S): CPT

## 2018-05-29 PROCEDURE — 87070 CULTURE OTHR SPECIMN AEROBIC: CPT

## 2018-05-29 PROCEDURE — 87102 FUNGUS ISOLATION CULTURE: CPT

## 2018-05-29 PROCEDURE — 88305 TISSUE EXAM BY PATHOLOGIST: CPT

## 2018-05-29 PROCEDURE — 31652 BRONCH EBUS SAMPLNG 1/2 NODE: CPT

## 2018-05-29 PROCEDURE — 71045 X-RAY EXAM CHEST 1 VIEW: CPT

## 2018-05-29 PROCEDURE — 87206 SMEAR FLUORESCENT/ACID STAI: CPT

## 2018-05-29 PROCEDURE — 00520 ANES CLOSED CHEST PX NOS: CPT

## 2018-05-29 PROCEDURE — 88341 IMHCHEM/IMCYTCHM EA ADD ANTB: CPT

## 2018-05-29 PROCEDURE — 87116 MYCOBACTERIA CULTURE: CPT

## 2018-05-29 PROCEDURE — 87205 SMEAR GRAM STAIN: CPT

## 2018-05-29 PROCEDURE — 88112 CYTOPATH CELL ENHANCE TECH: CPT

## 2018-05-29 PROCEDURE — 88173 CYTOPATH EVAL FNA REPORT: CPT

## 2018-05-29 PROCEDURE — 88172 CYTP DX EVAL FNA 1ST EA SITE: CPT

## 2018-05-29 PROCEDURE — 88342 IMHCHEM/IMCYTCHM 1ST ANTB: CPT

## 2018-05-29 NOTE — MR
cc:

EAGLE Kohli MD

****

 

 

DATE:

05/29/2018

 

PROCEDURE PERFORMED:

Bronchoscopy.

 

PROCEDURE NOTE:

After informed consent was obtained, the patient underwent diagnostic 

bronchoscopy with general anesthesia.

 

Initial examination of the airway revealed a normal distal trachea, 

but the takeoff of the right mainstem bronchus was narrowed and 

irregular.

 

Examination of the left mainstem bronchus again revealed some 

narrowing with extrinsic compression from the medial aspect in the 

subcarinal region.  Left upper lobe and lingula, lower lobes were 

normal.

 

Examination of the distal right mainstem bronchus was very abnormal 

with narrowing and mucosal irregularity.  The right upper lobe was 

almost completely occluded, although there was not visible tumor, it 

was all extrinsic compression.  Right lower lobes were also narrowed 

with mucosal irregularity and compression.  This area was washed 

extensively and 1 bronchial biopsy was obtained.

 

After these initial specimens were obtained, ultrasound bronchoscopy 

was performed.  There were multiple irregularities, particularly in 

the subcarinal region and in the right paratracheal region.

 

Transbronchial needle aspirates were taken from the station 7R and 

station 7L, multiple specimens.  Pathology did confirm adequate 

specimen.

 

In summary, the patient has an obvious mass compressing the subcarinal

region and right upper lobe.  Multiple specimens submitted for 

cytology, routine pathology and cultures.

 

He tolerated the procedure well and is being prepared for recovery.   

 

 

__________________________________

EAGLE Kohli MD

 

 

RSW/TL

D: 05/29/2018, 03:21 PM

T: 05/29/2018, 03:43 PM

Visit #: [id number]

Job #: [id number]

## 2018-05-29 NOTE — RADRPT
EXAM DATE:  5/29/2018 4:02 PM EDT

AGE/SEX:        82 years / Male



INDICATIONS:  Post bronchoscopy.



CLINICAL DATA:  This is the patient's initial encounter. Patient reports that signs and symptoms have
 been present for 1 day and indicates a pain score of 0/10. 

                                                                          

MEDICAL/SURGICAL HISTORY:       . Carcinoma, colon. Hypercholesterolemia. Peripheral neuropathy.  . C
olectomy.



COMPARISON:      [facility], CHEST SINGLE AP, 3/30/2018.  .



FINDINGS:  

Portable AP view of the chest demonstrate a normal-sized cardiac silhouette with calcification of the
 aorta. EKG lines overlie the patient. Lungs are underinflated with likely atelectasis at the lung ba
ses. No pneumothorax or pleural effusion is identified. Bones demonstrate no acute abnormality.



CONCLUSION: 

No pneumothorax or acute finding following bronchoscopy. There is likely subsegmental atelectasis at 
the lung bases.



Electronically signed by: Dank Cerda MD  5/29/2018 4:05 PM EDT

## 2018-06-19 ENCOUNTER — HOSPITAL ENCOUNTER (OUTPATIENT)
Dept: HOSPITAL 17 - HRIP | Age: 83
Discharge: HOME | End: 2018-06-19
Attending: INTERNAL MEDICINE
Payer: MEDICARE

## 2018-06-19 VITALS
HEART RATE: 99 BPM | DIASTOLIC BLOOD PRESSURE: 94 MMHG | RESPIRATION RATE: 20 BRPM | OXYGEN SATURATION: 92 % | SYSTOLIC BLOOD PRESSURE: 146 MMHG

## 2018-06-19 VITALS
RESPIRATION RATE: 20 BRPM | SYSTOLIC BLOOD PRESSURE: 137 MMHG | HEART RATE: 100 BPM | OXYGEN SATURATION: 92 % | TEMPERATURE: 97.5 F | DIASTOLIC BLOOD PRESSURE: 73 MMHG

## 2018-06-19 VITALS
RESPIRATION RATE: 20 BRPM | DIASTOLIC BLOOD PRESSURE: 83 MMHG | OXYGEN SATURATION: 90 % | SYSTOLIC BLOOD PRESSURE: 136 MMHG | HEART RATE: 100 BPM

## 2018-06-19 VITALS
SYSTOLIC BLOOD PRESSURE: 159 MMHG | DIASTOLIC BLOOD PRESSURE: 97 MMHG | OXYGEN SATURATION: 91 % | HEART RATE: 101 BPM | RESPIRATION RATE: 20 BRPM

## 2018-06-19 VITALS
HEART RATE: 97 BPM | RESPIRATION RATE: 20 BRPM | SYSTOLIC BLOOD PRESSURE: 172 MMHG | OXYGEN SATURATION: 90 % | DIASTOLIC BLOOD PRESSURE: 102 MMHG | TEMPERATURE: 97.7 F

## 2018-06-19 VITALS — BODY MASS INDEX: 23.88 KG/M2 | HEIGHT: 65 IN | WEIGHT: 143.3 LBS

## 2018-06-19 VITALS
RESPIRATION RATE: 18 BRPM | SYSTOLIC BLOOD PRESSURE: 147 MMHG | HEART RATE: 96 BPM | OXYGEN SATURATION: 93 % | DIASTOLIC BLOOD PRESSURE: 81 MMHG

## 2018-06-19 DIAGNOSIS — G62.9: ICD-10-CM

## 2018-06-19 DIAGNOSIS — Z90.49: ICD-10-CM

## 2018-06-19 DIAGNOSIS — I10: ICD-10-CM

## 2018-06-19 DIAGNOSIS — Z85.038: ICD-10-CM

## 2018-06-19 DIAGNOSIS — Z45.2: Primary | ICD-10-CM

## 2018-06-19 DIAGNOSIS — C34.90: ICD-10-CM

## 2018-06-19 DIAGNOSIS — E78.00: ICD-10-CM

## 2018-06-19 DIAGNOSIS — J44.9: ICD-10-CM

## 2018-06-19 DIAGNOSIS — Z86.73: ICD-10-CM

## 2018-06-19 DIAGNOSIS — K21.9: ICD-10-CM

## 2018-06-19 LAB
INR PPP: 1.1 RATIO
PROTHROMBIN TIME: 10.7 SEC (ref 9.8–11.6)

## 2018-06-19 PROCEDURE — 36561 INSERT TUNNELED CV CATH: CPT

## 2018-06-19 PROCEDURE — 99153 MOD SED SAME PHYS/QHP EA: CPT

## 2018-06-19 PROCEDURE — C1788 PORT, INDWELLING, IMP: HCPCS

## 2018-06-19 PROCEDURE — 76937 US GUIDE VASCULAR ACCESS: CPT

## 2018-06-19 PROCEDURE — 85610 PROTHROMBIN TIME: CPT

## 2018-06-19 PROCEDURE — 77001 FLUOROGUIDE FOR VEIN DEVICE: CPT

## 2018-06-19 PROCEDURE — 99152 MOD SED SAME PHYS/QHP 5/>YRS: CPT

## 2018-06-19 NOTE — PD.RAD
Post Procedure Progress Note


Pre Procedure Diagnosis:  


(1) Lung cancer


Post Procedure Diagnosis:  


(1) Lung cancer


Procedure Date:


Jun 19, 2018


Supervising Radiologist:


Shahriar Middleton


Estimated blood loss:


3cc


Anesthesia:  Local, Conscious Sedation


Plan of Activity


Patient to Unit:  [facility]


Patient Condition:  Good


Additional Comments:


Port placed via the left IJ  


Catheter in good position OK for use.


Full report to follow


See PACS Report for procedural detail/treatment











Shahriar Middleton MD Jun 19, 2018 09:28

## 2018-06-19 NOTE — RADRPT
EXAM DATE:  6/19/2018 9:43 AM EDT

AGE/SEX:        82 years / Male



INDICATIONS:  Patient with small cell lung carcinoma in need of [id number] placement.



CLINICAL DATA:  This is the patient's initial encounter. Patient reports that signs and symptoms have
 been present for 4 - 6 months and indicates a pain score of 0/10. 

                                                                          

MEDICAL/SURGICAL HISTORY:       Hypercholesterolemia.  Hypertension.  Chronic obstructive pulmonary d
isease.  TIA. Sullivan's esophagus. GERD. Colon cancer. Emphysema. Peripheral neuropathy.  . Colon res
ection. Colonoscopy. EGD. Bronchoscopy and biopsy



COMPARISON:      No prior exams available for comparison. 





FLUORO TIME (min):     0.4

IMAGE SERIES:               1

SEDATION TIME (min):  30







MEDICATION(S):

2mg midazolam (Versed) IV

100mcg fentanyl (Sublimaze) IV









Prophylactic antibiotics were administered with appropriate pre-procedure timing. Vancomycin within 2
 hrs of procedure, Ancef (or alternative) within 1 hr of procedure.



DEVICE(S):

Left 8F Xcelon plus port 









 

. .



PROCEDURE :    

1.  Continuous pulse oximetry and EKG monitoring.

2.  Intravenous conscious sedation.

3.  Ultrasound guidance for venous access.

4.  Fluoroscopic guided implantable central venous port placement.



The patient was placed supine. The neck was prepped in sterile fashion.  Full sterile technique was u
sed, including cap, mask, sterile gloves and gown, and a large sterile sheet.  Hand hygiene and 2% ch
lorhexidine Betadine was utilized per protocol for cutaneous antisepsis with appropriate dry time for
 site.  Sterile gel and sterile probe cover were utilized for ultrasound guidance.   The skin and sub
cutaneous tissues were infiltrated with local anesthetic solution.  



Under direct ultrasound guidance, the left internal jugular vein was accessed.  The ultrasound images
 depicting access guidance were stored and saved to PACS for permanent record.  A subcutaneous pocket
 was created using blunt dissection.  The port was introduced to the pocket.  The catheter tubing was
 fed through a subcutaneous tunnel to the venotomy site.  The catheter tubing was cut to a suitable l
ength and then was introduced through a valved Peel-Away sheath and positioned with catheter tubing t
ip at the cavo-atrial junction level.  The pocket incision was closed with subcuticular Vicryl suture
.  Steri-Strips were applied.  The port was flushed and locked with heparin solution per protocol.  S
terile dressing was applied to the site.  The patient tolerated the procedure well.



Conscious sedation was performed with the prescribed dosages and duration as above in the presence of
 an independent trained radiology nurse to assist in the monitoring of the patient.  EKG and oximetry
 remained stable throughout the procedure. The patient tolerated the procedure well and there were no
 complications. The patient was sent to post anesthesia recovery in stable condition.



CONCLUSION:  

1.  Uncomplicated ultrasound and fluoroscopic guided implanted central venous port catheter placement
 as described in detail above.  An 8 [employer] port was placed.



Electronically signed by: Shahriar Middleton MD  6/19/2018 3:08 PM EDT